# Patient Record
Sex: MALE | Race: WHITE | Employment: FULL TIME | URBAN - NONMETROPOLITAN AREA
[De-identification: names, ages, dates, MRNs, and addresses within clinical notes are randomized per-mention and may not be internally consistent; named-entity substitution may affect disease eponyms.]

---

## 2020-01-03 ENCOUNTER — TELEPHONE (OUTPATIENT)
Dept: PULMONOLOGY | Age: 50
End: 2020-01-03

## 2020-01-03 NOTE — TELEPHONE ENCOUNTER
Patient was returning a call to [facility] regarding scheduling a sleep consult through the [place]. Please call him to schedule.

## 2020-01-08 NOTE — TELEPHONE ENCOUNTER
Tried to call Pt. Phone is not connecting. Need to make appt for him and [facility] will call [facility] with date and time.

## 2020-01-10 ENCOUNTER — HOSPITAL ENCOUNTER (EMERGENCY)
Age: 50
Discharge: ANOTHER HOSPITAL | End: 2020-01-11
Payer: OTHER GOVERNMENT

## 2020-01-10 LAB
ACETAMINOPHEN LEVEL: < 5 UG/ML (ref 0–20)
ALBUMIN SERPL-MCNC: 4.6 G/DL (ref 3.5–5.1)
ALP BLD-CCNC: 88 U/L (ref 38–126)
ALT SERPL-CCNC: 26 U/L (ref 11–66)
AMPHETAMINE+METHAMPHETAMINE URINE SCREEN: NEGATIVE
ANION GAP SERPL CALCULATED.3IONS-SCNC: 17 MEQ/L (ref 8–16)
AST SERPL-CCNC: 24 U/L (ref 5–40)
ATYPICAL LYMPHOCYTES: ABNORMAL %
BACTERIA: ABNORMAL /HPF
BARBITURATE QUANTITATIVE URINE: NEGATIVE
BASOPHILS # BLD: 1 %
BASOPHILS ABSOLUTE: 0.1 THOU/MM3 (ref 0–0.1)
BENZODIAZEPINE QUANTITATIVE URINE: NEGATIVE
BILIRUB SERPL-MCNC: 0.4 MG/DL (ref 0.3–1.2)
BILIRUBIN DIRECT: < 0.2 MG/DL (ref 0–0.3)
BILIRUBIN URINE: NEGATIVE
BLOOD, URINE: ABNORMAL
BUN BLDV-MCNC: 13 MG/DL (ref 7–22)
CALCIUM SERPL-MCNC: 9.6 MG/DL (ref 8.5–10.5)
CANNABINOID QUANTITATIVE URINE: NEGATIVE
CASTS 2: ABNORMAL /LPF
CASTS UA: ABNORMAL /LPF
CHARACTER, URINE: CLEAR
CHLORIDE BLD-SCNC: 96 MEQ/L (ref 98–111)
CO2: 23 MEQ/L (ref 23–33)
COCAINE METABOLITE QUANTITATIVE URINE: NEGATIVE
COLOR: YELLOW
CREAT SERPL-MCNC: 0.9 MG/DL (ref 0.4–1.2)
CRYSTALS, UA: ABNORMAL
DIFFERENTIAL TYPE: ABNORMAL
EKG ATRIAL RATE: 88 BPM
EKG P AXIS: 37 DEGREES
EKG P-R INTERVAL: 194 MS
EKG Q-T INTERVAL: 352 MS
EKG QRS DURATION: 82 MS
EKG QTC CALCULATION (BAZETT): 425 MS
EKG R AXIS: 45 DEGREES
EKG T AXIS: 37 DEGREES
EKG VENTRICULAR RATE: 88 BPM
EOSINOPHIL # BLD: 0.5 %
EOSINOPHILS ABSOLUTE: 0.1 THOU/MM3 (ref 0–0.4)
EPITHELIAL CELLS, UA: ABNORMAL /HPF
ERYTHROCYTE [DISTWIDTH] IN BLOOD BY AUTOMATED COUNT: 12.2 % (ref 11.5–14.5)
ERYTHROCYTE [DISTWIDTH] IN BLOOD BY AUTOMATED COUNT: 41.9 FL (ref 35–45)
ETHYL ALCOHOL, SERUM: < 0.01 %
GFR SERPL CREATININE-BSD FRML MDRD: 89 ML/MIN/1.73M2
GLUCOSE BLD-MCNC: 184 MG/DL (ref 70–108)
GLUCOSE BLD-MCNC: 278 MG/DL (ref 70–108)
GLUCOSE URINE: >= 1000 MG/DL
HCT VFR BLD CALC: 45 % (ref 42–52)
HEMOGLOBIN: 15.4 GM/DL (ref 14–18)
IMMATURE GRANS (ABS): 0.18 THOU/MM3 (ref 0–0.07)
IMMATURE GRANULOCYTES: 1.3 %
KETONES, URINE: 15
LEUKOCYTE ESTERASE, URINE: NEGATIVE
LYMPHOCYTES # BLD: 44.4 %
LYMPHOCYTES ABSOLUTE: 6 THOU/MM3 (ref 1–4.8)
MCH RBC QN AUTO: 32.2 PG (ref 26–33)
MCHC RBC AUTO-ENTMCNC: 34.2 GM/DL (ref 32.2–35.5)
MCV RBC AUTO: 93.9 FL (ref 80–94)
MISCELLANEOUS 2: ABNORMAL
MONOCYTES # BLD: 6.8 %
MONOCYTES ABSOLUTE: 0.9 THOU/MM3 (ref 0.4–1.3)
NITRITE, URINE: NEGATIVE
NUCLEATED RED BLOOD CELLS: 0 /100 WBC
OPIATES, URINE: NEGATIVE
OSMOLALITY CALCULATION: 282 MOSMOL/KG (ref 275–300)
OXYCODONE: NEGATIVE
PATHOLOGIST REVIEW: ABNORMAL
PH UA: 5.5 (ref 5–9)
PHENCYCLIDINE QUANTITATIVE URINE: NEGATIVE
PLATELET # BLD: 299 THOU/MM3 (ref 130–400)
PMV BLD AUTO: 8.8 FL (ref 9.4–12.4)
POTASSIUM SERPL-SCNC: 5 MEQ/L (ref 3.5–5.2)
PROTEIN UA: NEGATIVE
RBC # BLD: 4.79 MILL/MM3 (ref 4.7–6.1)
RBC URINE: ABNORMAL /HPF
RENAL EPITHELIAL, UA: ABNORMAL
SALICYLATE, SERUM: < 0.3 MG/DL (ref 2–10)
SCAN OF BLOOD SMEAR: NORMAL
SEG NEUTROPHILS: 46 %
SEGMENTED NEUTROPHILS ABSOLUTE COUNT: 6.2 THOU/MM3 (ref 1.8–7.7)
SODIUM BLD-SCNC: 136 MEQ/L (ref 135–145)
SPECIFIC GRAVITY, URINE: 1.02 (ref 1–1.03)
TOTAL PROTEIN: 7.7 G/DL (ref 6.1–8)
TSH SERPL DL<=0.05 MIU/L-ACNC: 1.43 UIU/ML (ref 0.4–4.2)
UROBILINOGEN, URINE: 0.2 EU/DL (ref 0–1)
WBC # BLD: 13.5 THOU/MM3 (ref 4.8–10.8)
WBC UA: ABNORMAL /HPF
YEAST: ABNORMAL

## 2020-01-10 PROCEDURE — 82948 REAGENT STRIP/BLOOD GLUCOSE: CPT

## 2020-01-10 PROCEDURE — 36415 COLL VENOUS BLD VENIPUNCTURE: CPT

## 2020-01-10 PROCEDURE — 84443 ASSAY THYROID STIM HORMONE: CPT

## 2020-01-10 PROCEDURE — 82248 BILIRUBIN DIRECT: CPT

## 2020-01-10 PROCEDURE — 93005 ELECTROCARDIOGRAM TRACING: CPT | Performed by: PHYSICIAN ASSISTANT

## 2020-01-10 PROCEDURE — 85025 COMPLETE CBC W/AUTO DIFF WBC: CPT

## 2020-01-10 PROCEDURE — 81001 URINALYSIS AUTO W/SCOPE: CPT

## 2020-01-10 PROCEDURE — G0480 DRUG TEST DEF 1-7 CLASSES: HCPCS

## 2020-01-10 PROCEDURE — 6370000000 HC RX 637 (ALT 250 FOR IP): Performed by: PHYSICIAN ASSISTANT

## 2020-01-10 PROCEDURE — 80307 DRUG TEST PRSMV CHEM ANLYZR: CPT

## 2020-01-10 PROCEDURE — 96374 THER/PROPH/DIAG INJ IV PUSH: CPT

## 2020-01-10 PROCEDURE — 93010 ELECTROCARDIOGRAM REPORT: CPT | Performed by: INTERNAL MEDICINE

## 2020-01-10 PROCEDURE — 99285 EMERGENCY DEPT VISIT HI MDM: CPT

## 2020-01-10 PROCEDURE — 80053 COMPREHEN METABOLIC PANEL: CPT

## 2020-01-10 RX ORDER — GABAPENTIN 600 MG/1
300 TABLET ORAL ONCE
Status: COMPLETED | OUTPATIENT
Start: 2020-01-11 | End: 2020-01-11

## 2020-01-10 RX ORDER — VENLAFAXINE HYDROCHLORIDE 150 MG/1
150 TABLET, EXTENDED RELEASE ORAL 2 TIMES DAILY
COMMUNITY

## 2020-01-10 RX ORDER — BUSPIRONE HYDROCHLORIDE 10 MG/1
10 TABLET ORAL 2 TIMES DAILY
COMMUNITY

## 2020-01-10 RX ORDER — LORAZEPAM 1 MG/1
1 TABLET ORAL ONCE
Status: COMPLETED | OUTPATIENT
Start: 2020-01-11 | End: 2020-01-10

## 2020-01-10 RX ORDER — INSULIN GLARGINE 100 [IU]/ML
INJECTION, SOLUTION SUBCUTANEOUS NIGHTLY
COMMUNITY

## 2020-01-10 RX ADMIN — LORAZEPAM 1 MG: 1 TABLET ORAL at 23:53

## 2020-01-10 RX ADMIN — Medication 5 UNITS: at 20:28

## 2020-01-10 ASSESSMENT — LIFESTYLE VARIABLES: HISTORY_ALCOHOL_USE: YES

## 2020-01-10 ASSESSMENT — PAIN DESCRIPTION - PAIN TYPE: TYPE: CHRONIC PAIN

## 2020-01-10 ASSESSMENT — PAIN DESCRIPTION - LOCATION: LOCATION: LEG

## 2020-01-10 ASSESSMENT — PAIN DESCRIPTION - ORIENTATION: ORIENTATION: LEFT;RIGHT

## 2020-01-10 ASSESSMENT — ENCOUNTER SYMPTOMS
DIARRHEA: 0
BACK PAIN: 0
SHORTNESS OF BREATH: 0
NAUSEA: 0
CONSTIPATION: 0
VOMITING: 0
ABDOMINAL PAIN: 0

## 2020-01-10 ASSESSMENT — PATIENT HEALTH QUESTIONNAIRE - PHQ9: SUM OF ALL RESPONSES TO PHQ QUESTIONS 1-9: 17

## 2020-01-10 ASSESSMENT — PAIN DESCRIPTION - FREQUENCY: FREQUENCY: CONTINUOUS

## 2020-01-10 ASSESSMENT — PAIN SCALES - GENERAL: PAINLEVEL_OUTOF10: 3

## 2020-01-10 NOTE — PROGRESS NOTES
1827  Call from [address] [place] [address] will fax a packet to [facility] ED for completion. Clinician will return the completed packet by fax.

## 2020-01-10 NOTE — ED NOTES
Pt resting in bed at this time, pt denies any needs. Lights dimmed for comfort. 1:1 sitter in place at this time, campus police monitoring at this time.       Kenny Torres LPN  22/15/33 5408

## 2020-01-10 NOTE — PROGRESS NOTES
[address]  Call from Zoe Jlues who state pts insurance does not cover transport per [facility].   (Pt updated)

## 2020-01-10 NOTE — PROGRESS NOTES
Randall8  Call to [facility], consulted with Julissa Espinoza who will seek placement at [facility].
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
## 2020-01-10 NOTE — ED PROVIDER NOTES
appearance. He is well-developed. He is not ill-appearing, toxic-appearing or diaphoretic. HENT:      Head: Normocephalic and atraumatic. Right Ear: External ear normal.      Left Ear: External ear normal.      Nose: Nose normal.      Mouth/Throat:      Mouth: Mucous membranes are moist.      Pharynx: Oropharynx is clear. Eyes:      General: No scleral icterus. Right eye: No discharge. Left eye: No discharge. Conjunctiva/sclera: Conjunctivae normal.      Pupils: Pupils are equal, round, and reactive to light. Neck:      Musculoskeletal: Normal range of motion. Cardiovascular:      Rate and Rhythm: Normal rate and regular rhythm. Heart sounds: Normal heart sounds. No murmur. No friction rub. No gallop. Pulmonary:      Effort: Pulmonary effort is normal. No respiratory distress. Breath sounds: Normal breath sounds. No stridor. No wheezing, rhonchi or rales. Chest:      Chest wall: No tenderness. Abdominal:      General: Bowel sounds are normal. There is no distension. Palpations: Abdomen is soft. There is no mass. Tenderness: There is no tenderness. There is no guarding or rebound. Hernia: No hernia is present. Musculoskeletal: Normal range of motion. Skin:     General: Skin is warm and dry. Coloration: Skin is not pale. Findings: No erythema or rash. Neurological:      Mental Status: He is alert and oriented to person, place, and time. GCS: GCS eye subscore is 4. GCS verbal subscore is 5. GCS motor subscore is 6. Motor: No abnormal muscle tone. Coordination: Coordination normal.   Psychiatric:         Mood and Affect: Mood is depressed. Speech: Speech normal.         Behavior: Behavior normal.         Thought Content: Thought content includes suicidal ideation. Thought content does not include homicidal ideation. Thought content includes suicidal plan. Thought content does not include homicidal plan. DIFFERENTIAL DIAGNOSIS:   Including but not limited to: depression, suicidal ideation, anxiety, PTSD, adjustment disorder, bipolar disorder, mood disorder, substance abuse, substance induced mood disorder, alcohol intoxication    DIAGNOSTIC RESULTS     EKG: All EKG's are interpreted by the Emergency Department Physician who either signs or Co-signs this chart in the absence of a cardiologist.    EKG 12 Lead (Final result)    Component (Lab Inquiry)   Collection Time Result Time Ventricular Rate Atrial Rate P-R Interval QRS Duration Q-T Interval   01/10/20 18:35:03 01/10/20 18:35:03 88 88 194 82 352       Collection Time Result Time QTc Calculation (Bazett) P Axis R Axis T Axis   01/10/20 18:35:03 01/10/20 18:35:03 425 37 45 37         Final result                Narrative:    Normal sinus rhythm  Normal ECG  When compared with ECG of 23-APR-2013 20:56,  No significant change was found  Confirmed by Jesse Christensen MD, Snow Oh ([id number]) on 1/10/2020 7:20:57 PM                RADIOLOGY: non-plainfilm images(s) such as CT, Ultrasound and MRI are read by the radiologist.    No orders to display       LABS:     Labs Reviewed   CBC WITH AUTO DIFFERENTIAL - Abnormal; Notable for the following components:       Result Value    WBC 13.5 (*)     MPV 8.8 (*)     Lymphocytes Absolute 6.0 (*)     Immature Grans (Abs) 0.18 (*)     All other components within normal limits   BASIC METABOLIC PANEL - Abnormal; Notable for the following components:    Chloride 96 (*)     Glucose 278 (*)     All other components within normal limits   SALICYLATE LEVEL - Abnormal; Notable for the following components:    Salicylate, Serum < 0.3 (*)     All other components within normal limits   GLOMERULAR FILTRATION RATE, ESTIMATED - Abnormal; Notable for the following components:    Est, Glom Filt Rate 89 (*)     All other components within normal limits   URINE WITH REFLEXED MICRO - Abnormal; Notable for the following components:    Glucose, Ur >= 1000 (*) Ketones, Urine 15 (*)     Blood, Urine TRACE (*)     All other components within normal limits   ANION GAP - Abnormal; Notable for the following components:    Anion Gap 17.0 (*)     All other components within normal limits   POCT GLUCOSE - Abnormal; Notable for the following components:    POC Glucose 184 (*)     All other components within normal limits   HEPATIC FUNCTION PANEL   TSH WITHOUT REFLEX   ACETAMINOPHEN LEVEL   ETHANOL   URINE DRUG SCREEN   SCAN OF BLOOD SMEAR   OSMOLALITY       EMERGENCY DEPARTMENT COURSE:   Vitals:    Vitals:    01/10/20 1551 01/10/20 1928 01/10/20 2344 01/11/20 0359   BP: (!) 177/97 138/67 (!) 158/98 111/62   Pulse: 105 85 97 81   Resp: 18 17 18 16   Temp: 98.6 °F (37 °C)      TempSrc: Oral      SpO2: 97% 97% 96% 97%   Weight: (!) 305 lb (138.3 kg)      Height: 6' (1.829 m)          MDM:  The patient was seen and evaluated within the ED today with depression and suicidal ideation with a plan. Within the department, I observed the patient's vital signs to be within acceptable range. The patient appears to be depressed. His exam is otherwise unremarkable. Laboratory work revealed a WBC count of 13.5. BG was noted to be 278. EtOH and UDS are negative. Within the department, the patient was treated with 5 units of insulin, and his BG at recheck was 184. The patient was also given Ativan for later reported anxiety and Neurontin for later reported neuropathic pain. I observed the patient's condition to remain stable during the duration of the stay. Dr. Cecil Mora, Psychiatry, was consulted and advises transfer to the [facility] for further inpatient psychiatric evaluation and management. The patient remained stable and maintained stable vital signs until the time of transfer to the [facility].     CRITICAL CARE:   None      CONSULTS:  [facility]    Dr. Cecil Mora, Psychiatry - advises transfer to the [facility] for further inpatient psychiatric evaluation and management    PROCEDURES:  None FINAL IMPRESSION      1. Depression with suicidal ideation          DISPOSITION/PLAN   Transfer to [facility] for inpatient psychiatric evaluation and management    PATIENT REFERRED TO:  No follow-up provider specified. DISCHARGE MEDICATIONS:  Discharge Medication List as of 1/11/2020  6:45 AM          (Please note that portions of this note were completed with a voice recognition program.  Efforts were made to edit the dictations but occasionally words are mis-transcribed.)    The patient was given an opportunity to see the Emergency Attending. The patient voiced understanding that I was a Mid-LevelProvider and was in agreement with being seen independently by myself. Scribe:  Cony Perera 1/10/20 4:39 PM Scribing for and in the presence of Verónica Lee PA-C. Signed by: Amos Guerrero, 01/12/20 10:04 PM    Provider:  I personally performed the services described in the documentation, reviewed and edited the documentation which was dictated to the scribe in my presence, and it accurately records my words and actions.     Verónica Lee PA-C 1/10/20 10:04 PM        Verónica Lee PA-C  01/12/20 4217

## 2020-01-10 NOTE — ED NOTES
Patient presents to ED via [facility] from the [facility] for suicidal ideation and depression. States for the last month he has had thoughts of suicide and has been depressed. Patient reports he has a plan to find a high building and jump off of it but states he doesn't actually have any intentions of doing this. Complains of chronic bilateral leg pain, 3/10 with sitting. Shows no signs of distress at this time. Calm and cooperative. Skin warm and dry. Respirations easy and unlabored. Patient placed in safe room that is ligature resistant with continuous monitoring in place. Provider notified, requested an assessment by behavioral health . Patient belongings secured in a locked lockers outside of the room. Explained suicide prevention precautions to the patient including constant observer.       Hood Brown RN  01/10/20 7996

## 2020-01-10 NOTE — PROGRESS NOTES
Guero Martin 1  Call from [place] [facility] [employer], Jennifer Apple 371 will also need an EKG.   (ED Nurse Glenna Yarbrough updated)

## 2020-01-11 VITALS
RESPIRATION RATE: 16 BRPM | BODY MASS INDEX: 41.31 KG/M2 | TEMPERATURE: 98.6 F | OXYGEN SATURATION: 97 % | HEART RATE: 81 BPM | WEIGHT: 305 LBS | HEIGHT: 72 IN | DIASTOLIC BLOOD PRESSURE: 62 MMHG | SYSTOLIC BLOOD PRESSURE: 111 MMHG

## 2020-01-11 PROCEDURE — 6370000000 HC RX 637 (ALT 250 FOR IP): Performed by: PHYSICIAN ASSISTANT

## 2020-01-11 RX ADMIN — GABAPENTIN 300 MG: 600 TABLET, FILM COATED ORAL at 00:12

## 2020-01-11 NOTE — ED NOTES
Pt sitting on side of bed facing wall. Pt given ice water. Respirations regular at this time. Pt denied other needs at this time.  Will continue to monitor      Zane Wilcox RN  01/11/20 0017

## 2020-01-11 NOTE — PROGRESS NOTES
1921  Call from [facility] who clarified paperwork, pt to sign on page 2 regarding transport. Form completed and returned to [facility] at 8140.

## 2020-01-11 NOTE — ED NOTES
Pt resting in bed sitting up. Pt VS reassessed. Respirations regular. Pt given food and Gatorade. Pt states his anxiety is really high and his neuropathy is acting up.  Will notfiy Danielle PA Claudene Pupa, RN  01/10/20 9040

## 2020-01-11 NOTE — PROGRESS NOTES
2057  Call from [facility], [address] need printed name of physician completing the form and faxed.

## 2020-01-11 NOTE — PROGRESS NOTES
2040  [facility] faxed to [facility].     2041  Call to [facility] Scott, consulted with Meeta Bojorquez, informed [facility] has been faxed. [facility] will set up transport.

## 2020-01-11 NOTE — PROGRESS NOTES
2030  Call from [facility] 192, pt has been accepted. Accepting information is listed in Epic. [facility] [address].   (ED Nurse Cole Trevizo updated)

## 2020-01-29 ENCOUNTER — INITIAL CONSULT (OUTPATIENT)
Dept: PULMONOLOGY | Age: 50
End: 2020-01-29
Payer: OTHER GOVERNMENT

## 2020-01-29 VITALS
SYSTOLIC BLOOD PRESSURE: 126 MMHG | BODY MASS INDEX: 42.58 KG/M2 | DIASTOLIC BLOOD PRESSURE: 84 MMHG | HEART RATE: 101 BPM | WEIGHT: 314.4 LBS | OXYGEN SATURATION: 97 % | HEIGHT: 72 IN

## 2020-01-29 PROCEDURE — 99204 OFFICE O/P NEW MOD 45 MIN: CPT | Performed by: INTERNAL MEDICINE

## 2020-01-29 RX ORDER — TRAZODONE HYDROCHLORIDE 50 MG/1
50 TABLET ORAL NIGHTLY
COMMUNITY

## 2020-01-29 RX ORDER — GABAPENTIN 800 MG/1
800 TABLET ORAL 3 TIMES DAILY
COMMUNITY

## 2020-01-29 RX ORDER — BACLOFEN 10 MG/1
10 TABLET ORAL 3 TIMES DAILY
COMMUNITY

## 2020-01-29 RX ORDER — LIOTHYRONINE SODIUM 25 UG/1
25 TABLET ORAL DAILY
COMMUNITY

## 2020-01-29 RX ORDER — HYDROXYZINE 50 MG/1
50 TABLET, FILM COATED ORAL 3 TIMES DAILY PRN
COMMUNITY

## 2020-01-29 RX ORDER — ATORVASTATIN CALCIUM 20 MG/1
20 TABLET, FILM COATED ORAL DAILY
COMMUNITY

## 2020-01-29 RX ORDER — ERGOCALCIFEROL (VITAMIN D2) 1250 MCG
50000 CAPSULE ORAL WEEKLY
COMMUNITY

## 2020-01-29 NOTE — PROGRESS NOTES
occasionally  History of dry mouth in the morning: Yes--some times. History of palpitations during night time/nocturnal awakenings: No.  History of sweating during night time/nocturnal awakenings: No    General:  History of head injury in the past: No.    History of seizures: No.   Rest less legs syndrome symptoms:NO  History suggestive of periodic limb movements during sleep: NO  History suggestive of hypnagogic hallucinations: NO  History suggestive of hypnopompic hallucinations: NO  History suggestive of sleep talking: Yes  History suggestive of sleep walking:NO  History suggestive of bruxism: Yes. [x] Uses mouth guard. History suggestive of cataplexy: NO  History suggestive of sleep paralysis: NO    Family history of sleep disorders:  Family history of obstructive sleep apnea: NO.  Family history of Narcolepsy: NO.  Family history of Rest less legs syndrome : NO. Oscar Carlos History regarding old sleep studies:  Prior history of sleep study: No.  Using CPAP device: No.  Currently using home Oxygen: NO.      Patient considerations:  Is the patient is ambulatory: Yes  Patient is currently using: None of these Wheelchair, Ynvisible or [place] Penta. Para/Quadriplegic: NO  Hearing deficit : NO  Claustrophobic: NO  MDD : NO  Blind: NO  Incontinent: NO  Para/Quadraplegi: NO.   Need transportation to and from Sleep Center:NO      Social History:  Social History     Tobacco Use    Smoking status: Never Smoker    Smokeless tobacco: Never Used   Substance Use Topics    Alcohol use: Yes     Comment: 7-8 beers daily x1.5 weeks    Drug use: No   .  He is currently working: Yes. He is currently working for [employer]. He is working as a .   He usually goes to his work at:  7:00AM.   He completes his work at:  4:30PM.                          Past Medical History:   Diagnosis Date    Diabetes mellitus ([facility] [address]     Hypertension     Neuromuscular disorder ([facility] [address]     neuroapathy        Past Surgical History: Procedure Laterality Date    APPENDECTOMY      SHOULDER SURGERY Left        No Known Allergies    Current Outpatient Medications   Medication Sig Dispense Refill    baclofen (LIORESAL) 10 MG tablet Take 10 mg by mouth 3 times daily      ergocalciferol (ERGOCALCIFEROL) 1.25 MG (22444 UT) capsule Take 50,000 Units by mouth once a week      gabapentin (NEURONTIN) 800 MG tablet Take 800 mg by mouth 3 times daily.  hydrOXYzine (ATARAX) 50 MG tablet Take 50 mg by mouth 3 times daily as needed for Itching      liothyronine (CYTOMEL) 25 MCG tablet Take 25 mcg by mouth daily      atorvastatin (LIPITOR) 20 MG tablet Take 20 mg by mouth daily      blood glucose test strips (ACCU-CHEK PADMINI PLUS) strip 1 each by In Vitro route daily As needed.  traZODone (DESYREL) 50 MG tablet Take 50 mg by mouth nightly      INSULIN ASPART SC Inject 35 Units into the skin Before meals 3 times daily      Insulin Glargine (LANTUS SC) Inject 80 Units into the skin One time daily      venlafaxine 150 MG extended release tablet Take 150 mg by mouth 2 times daily      busPIRone (BUSPAR) 10 MG tablet Take 10 mg by mouth 2 times daily      lisinopril (PRINIVIL;ZESTRIL) 10 MG tablet Take 40 mg by mouth daily.  amLODIPine (NORVASC) 10 MG tablet Take 10 mg by mouth daily.  metformin (GLUCOPHAGE) 1000 MG tablet Take 1,000 mg by mouth 2 times daily (with meals).  insulin glargine (LANTUS) 100 UNIT/ML injection vial Inject into the skin nightly      insulin lispro (HUMALOG) 100 UNIT/ML injection vial Inject into the skin 3 times daily (before meals)      aspirin 325 MG EC tablet Take 1 tablet by mouth daily. 30 tablet     GLIPIZIDE PO Take 1.5 mg by mouth 2 times daily.  gabapentin (NEURONTIN) 300 MG capsule Take 300 mg by mouth 3 times daily.  insulin 70-30 (HUMULIN 70/30) (70-30) 100 UNIT/ML injection Inject 50 Units into the skin 2 times daily (before meals).       Insulin Isophane Human (HUMULIN N SC) Inject  into the skin. No current facility-administered medications for this visit. Family History   Problem Relation Age of Onset    High Blood Pressure Mother     High Blood Pressure Father     Stroke Father         Review of Systems:   General/Constitutional: No recent loss of weight or appetite changes. No fever or chills. HENT: Negative. Eyes: Negative. Upper respiratory tract: Occasional nasal stuffiness with no post nasal drip. Lower respiratory tract/ lungs: No cough or sputum production. No hemoptysis. Cardiovascular: No palpitations or chest pain. Gastrointestinal: No nausea or vomiting. Neurological: No focal neurologiacal weakness. Extremities: No edema. Musculoskeletal: No complaints. Genitourinary: No complaints. Hematological: Negative. Psychiatric/Behavioral: Negative. Skin: No itching. /84 (Site: Left Upper Arm, Position: Sitting, Cuff Size: Large Adult)   Pulse 101   Ht 6' (1.829 m)   Wt (!) 314 lb 6.4 oz (142.6 kg)   SpO2 97% Comment: on room air at rest  BMI 42.64 kg/m²   Mallampati airway Class:III  Neck Circumference:19.5 Inches  [place] sleepiness score 1/29/20: 13    Physical Exam   Nursing note and vitals reviewed. Constitutional: Patient appears well built, obese and well nourished. No distress. Patient is oriented to person, place, and time. HENT:   Head: Normocephalic and atraumatic. Right Ear: External ear normal.   Left Ear: External ear normal.   Mouth/Throat: Oropharynx is clear and moist.  No oral thrush. Eyes: Conjunctivae are normal. Pupils are equal, round, and reactive to light. No scleral icterus. Neck: Neck supple. No JVD present. No tracheal deviation present. Cardiovascular: Normal rate, regular rhythm, normal heart sounds. No murmur heard. Pulmonary/Chest: Effort normal and breath sounds normal. No stridor. No respiratory distress. No wheezes. No rales. Patient exhibits no tenderness. Abdominal: Soft.  Patient exhibits no distension. No tenderness. Musculoskeletal: Normal range of motion. Extremities: Patient exhibits no edema and no tenderness. Lymphadenopathy:  No cervical adenopathy. Neurological: Patient is alert and oriented to person, place, and time. Skin: Skin is warm and dry. Patient is not diaphoretic. Psychiatric: Patient  has a normal mood and affect. Patient behavior is normal.     Diagnostic Data:  None related sleep. Assessment:  -Snoring with witnessed apneas,frequent nocturnal awakenings and excessive daytime sleepiness to evaluate for obstructive sleep apnea. -Inadequate sleep hygiene.  -Hypersomnia ( Excessive daytime sleepiness) may be due to obstructive sleep apnea Vs Inadequate sleep hygiene Vs his current medications with sedation as side effect I.e Trazodone, Neurontin, Atarax and Baclofen. -Diabetes mellitus ([address] -Hypertension.  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  -Hx of Bruxism. He is currently using mouth guard. Recommendations/Plan:  -Will schedule patient for polysomnogram in the sleep lab. -I had a discussion with patient regarding avialable treatment options for his sleep disorder breathing including but not limited to CPAP titration in the sleep lab Vs.Dental appliance placement with referral to a local dentist Vs other available surgical options including Uvulopalatopharyngoplasty, maxillomandibular ostomy and tracheostomy as last option.  At the end of discussion, he is not decided on his   treatment if he found to have obstructive sleep apnea at this time.  -He was advised to discuss with his family physician regarding optimization of his current sedative medications I.e Trazodone, Neurontin, Atarax and Baclofen due to his ongoing excessive daytime sleepiness and to consider for alternate treatment to improve his excessive daytime sleepiness.  -We will see Alli Segura back in 1week after the sleep study to go over the sleep study results and further management options.  -He was educated to practice good sleep hygiene practices. He  was provided with a good sleep hygiene hand out.  -Brad Orosco was advised to make earlier appointment with my clinic if he develops any worsening of sleep symptoms. He verbalizes understanding.  -Brad Orosco was advised to not to drive any motor vehicles or operate heavy equipment until his sleep symptoms are under good control. Wendy Carrero verbalizes understanding.   -He was advised to loose weight by controlling diet and doing exercise once cleared by his family physician. - Wendy Carrero was educated about my impression and plan. He verbalizes understanding.

## 2020-02-06 ENCOUNTER — HOSPITAL ENCOUNTER (OUTPATIENT)
Dept: SLEEP CENTER | Age: 50
Discharge: HOME | End: 2020-02-08
Payer: OTHER GOVERNMENT

## 2020-02-06 PROCEDURE — 95810 POLYSOM 6/> YRS 4/> PARAM: CPT

## 2020-02-10 LAB — STATUS: NORMAL

## 2020-02-13 ENCOUNTER — TELEPHONE (OUTPATIENT)
Dept: PULMONOLOGY | Age: 50
End: 2020-02-13

## 2020-02-13 NOTE — TELEPHONE ENCOUNTER
Patient phoned in, recently had sleep study completed. Patient reported he is out of town permanently. Would you be able to call patient with results? Please advise, thank you.